# Patient Record
Sex: FEMALE | Employment: FULL TIME | URBAN - METROPOLITAN AREA
[De-identification: names, ages, dates, MRNs, and addresses within clinical notes are randomized per-mention and may not be internally consistent; named-entity substitution may affect disease eponyms.]

---

## 2024-10-25 ENCOUNTER — OFFICE VISIT (OUTPATIENT)
Age: 59
End: 2024-10-25
Payer: COMMERCIAL

## 2024-10-25 VITALS
BODY MASS INDEX: 32.4 KG/M2 | WEIGHT: 171.6 LBS | SYSTOLIC BLOOD PRESSURE: 106 MMHG | HEART RATE: 113 BPM | HEIGHT: 61 IN | DIASTOLIC BLOOD PRESSURE: 69 MMHG

## 2024-10-25 DIAGNOSIS — E11.65 TYPE 2 DIABETES MELLITUS WITH HYPERGLYCEMIA, WITH LONG-TERM CURRENT USE OF INSULIN (HCC): Primary | ICD-10-CM

## 2024-10-25 DIAGNOSIS — Z79.4 TYPE 2 DIABETES MELLITUS WITH HYPERGLYCEMIA, WITH LONG-TERM CURRENT USE OF INSULIN (HCC): Primary | ICD-10-CM

## 2024-10-25 PROCEDURE — 99204 OFFICE O/P NEW MOD 45 MIN: CPT | Performed by: INTERNAL MEDICINE

## 2024-10-25 RX ORDER — ERGOCALCIFEROL 1.25 MG/1
50000 CAPSULE, LIQUID FILLED ORAL WEEKLY
COMMUNITY
Start: 2024-08-10

## 2024-10-25 RX ORDER — PINDOLOL 5 MG/1
5 TABLET ORAL DAILY
COMMUNITY

## 2024-10-25 RX ORDER — ROFLUMILAST 3 MG/G
CREAM TOPICAL
COMMUNITY
Start: 2024-09-10

## 2024-10-25 RX ORDER — LANOLIN ALCOHOL/MO/W.PET/CERES
1000 CREAM (GRAM) TOPICAL DAILY
COMMUNITY

## 2024-10-25 RX ORDER — LOSARTAN POTASSIUM 25 MG/1
25 TABLET ORAL DAILY
COMMUNITY
Start: 2024-08-15

## 2024-10-25 RX ORDER — DULOXETIN HYDROCHLORIDE 30 MG/1
30 CAPSULE, DELAYED RELEASE ORAL DAILY
COMMUNITY
Start: 2024-08-16

## 2024-10-25 RX ORDER — METHYLPHENIDATE HYDROCHLORIDE 18 MG/1
18 TABLET, EXTENDED RELEASE ORAL EVERY MORNING
COMMUNITY
Start: 2024-09-16

## 2024-10-25 RX ORDER — MONTELUKAST SODIUM 10 MG/1
10 TABLET ORAL NIGHTLY
COMMUNITY
Start: 2024-02-13

## 2024-10-25 NOTE — PROGRESS NOTES
Chief Complaint   Patient presents with    New Patient    Diabetes     Pcp and pharmacy verified       HPI  This is a 59-year-old woman with a history of type 2 diabetes mellitus x 10 years self-referred for evaluation and management.  She tells me that her mother her sister and maternal grand parents either have or had diabetes.  She has been on metformin nearly the entire time and more recently has been on GLP-1 agonist.  More recently, the metformin was discontinued.  She presents today with an A1c of 5.8%    Past medical history is notable for POTS syndrome.    She also has menopausal symptoms  She also has narcolepsy    Current diabetes medication  Ozempic 2 mg weekly    She tells me that she has struggled with weight her entire life.  She has been on and off multiple diets.  Most recently she has been on a weight loss program using compounded semaglutide and more recently switched to branded semaglutide 2 mg weekly.  She lost about 30 pounds on the regimen and was very pleased with the weight loss.  However her concern is that she does not want to go on this medication the rest of her life and in fact is very concerned with all the medications that she is taking.    Her diet is fairly liberal in terms of carbohydrate.  Breakfast is a Premier shake with cereal and fruit or eggs.  Lunch can be a frozen meal or can be cheese and crackers.  She eats candy at work.  Dinner can be a meat a salad and vegetables sometimes rice.  She does admit that she is eating more rice recently.  She is also snacking at night on fruit bars ice cream and cookies.  She admits that the 2 mg of Ozempic is not preventing the cravings which she had previously been avoiding with the Ozempic  ROS  Review of Systems - General ROS: positive for  - fatigue  Psychological ROS: positive for - anxiety  Ophthalmic ROS: negative  ENT ROS: negative  Respiratory ROS: no cough, shortness of breath, or wheezing  Cardiovascular ROS: no chest pain or